# Patient Record
Sex: FEMALE | Race: BLACK OR AFRICAN AMERICAN | NOT HISPANIC OR LATINO | Employment: FULL TIME | URBAN - METROPOLITAN AREA
[De-identification: names, ages, dates, MRNs, and addresses within clinical notes are randomized per-mention and may not be internally consistent; named-entity substitution may affect disease eponyms.]

---

## 2017-05-15 RX ORDER — LABETALOL 100 MG/1
TABLET, FILM COATED ORAL
Qty: 60 TABLET | Refills: 0 | Status: SHIPPED | OUTPATIENT
Start: 2017-05-15 | End: 2019-08-02 | Stop reason: SDUPTHER

## 2018-03-05 ENCOUNTER — OFFICE VISIT (OUTPATIENT)
Dept: OBSTETRICS AND GYNECOLOGY | Facility: CLINIC | Age: 38
End: 2018-03-05
Payer: COMMERCIAL

## 2018-03-05 VITALS
DIASTOLIC BLOOD PRESSURE: 98 MMHG | SYSTOLIC BLOOD PRESSURE: 150 MMHG | BODY MASS INDEX: 39.8 KG/M2 | WEIGHT: 268.75 LBS | HEIGHT: 69 IN

## 2018-03-05 DIAGNOSIS — N97.9 INFERTILITY, FEMALE: ICD-10-CM

## 2018-03-05 DIAGNOSIS — N93.8 DUB (DYSFUNCTIONAL UTERINE BLEEDING): ICD-10-CM

## 2018-03-05 DIAGNOSIS — R10.2 PELVIC PAIN IN FEMALE: ICD-10-CM

## 2018-03-05 DIAGNOSIS — N93.0 PCB (POST COITAL BLEEDING): ICD-10-CM

## 2018-03-05 DIAGNOSIS — N89.8 VAGINAL DISCHARGE: ICD-10-CM

## 2018-03-05 DIAGNOSIS — Z01.419 ENCOUNTER FOR GYNECOLOGICAL EXAMINATION WITHOUT ABNORMAL FINDING: Primary | ICD-10-CM

## 2018-03-05 LAB
BACTERIA #/AREA URNS AUTO: ABNORMAL /HPF
BILIRUB UR QL STRIP: NEGATIVE
CANDIDA RRNA VAG QL PROBE: NEGATIVE
CLARITY UR REFRACT.AUTO: ABNORMAL
COLOR UR AUTO: YELLOW
G VAGINALIS RRNA GENITAL QL PROBE: NEGATIVE
GLUCOSE UR QL STRIP: NEGATIVE
HGB UR QL STRIP: ABNORMAL
HYALINE CASTS UR QL AUTO: 6 /LPF
KETONES UR QL STRIP: NEGATIVE
LEUKOCYTE ESTERASE UR QL STRIP: NEGATIVE
MICROSCOPIC COMMENT: ABNORMAL
NITRITE UR QL STRIP: NEGATIVE
PH UR STRIP: 5 [PH] (ref 5–8)
PROT UR QL STRIP: ABNORMAL
RBC #/AREA URNS AUTO: 7 /HPF (ref 0–4)
SP GR UR STRIP: 1.02 (ref 1–1.03)
SQUAMOUS #/AREA URNS AUTO: 8 /HPF
T VAGINALIS RRNA GENITAL QL PROBE: NEGATIVE
URN SPEC COLLECT METH UR: ABNORMAL
UROBILINOGEN UR STRIP-ACNC: NEGATIVE EU/DL
WBC #/AREA URNS AUTO: 2 /HPF (ref 0–5)

## 2018-03-05 PROCEDURE — 81001 URINALYSIS AUTO W/SCOPE: CPT

## 2018-03-05 PROCEDURE — 87491 CHLMYD TRACH DNA AMP PROBE: CPT

## 2018-03-05 PROCEDURE — 99385 PREV VISIT NEW AGE 18-39: CPT | Mod: S$GLB,,, | Performed by: OBSTETRICS & GYNECOLOGY

## 2018-03-05 PROCEDURE — 87086 URINE CULTURE/COLONY COUNT: CPT

## 2018-03-05 PROCEDURE — 99999 PR PBB SHADOW E&M-EST. PATIENT-LVL IV: CPT | Mod: PBBFAC,,, | Performed by: OBSTETRICS & GYNECOLOGY

## 2018-03-05 PROCEDURE — 87480 CANDIDA DNA DIR PROBE: CPT

## 2018-03-05 PROCEDURE — 88175 CYTOPATH C/V AUTO FLUID REDO: CPT

## 2018-03-05 RX ORDER — NAPROXEN SODIUM 550 MG/1
550 TABLET ORAL
Qty: 30 TABLET | Refills: 12 | Status: SHIPPED | OUTPATIENT
Start: 2018-03-05 | End: 2019-08-02

## 2018-03-05 RX ORDER — DIPHENHYDRAMINE HYDROCHLORIDE 25 MG/1
CAPSULE ORAL
Refills: 99 | COMMUNITY
Start: 2017-11-27 | End: 2018-03-05

## 2018-03-05 NOTE — PROGRESS NOTES
03/07/18 U/S  Uterus:  Size: 6.8 x 3 x 3.8 cm  Masses: None  Endometrium: Normal, measuring 7.4 mm.  Right ovary:  Size: 3 x 2 x 3.3 cm  Appearance: Normal with follicles.  Vascular flow: Normal.  Left ovary:  Size: 3 x 1.8 x 3.2 cm  Appearance: Normal with follicles.  Vascular Flow: Normal.  Free Fluid:  None.      Impression     No sonographic abnormality.   WNL         3/5/2018    Hemoglobin 12.8   Hematocrit 38.8   Trichomonas vaginalis Negative   Gardnerella vaginalis Negative   Candida sp Negative   Chlamydia, Amplified DNA Not Detected   Specimen UA Urine, Clean Catch   Color, UA Yellow   pH, UA 5.0   Specific [place], UA 1.020   Appearance, UA Hazy (A)   Protein, UA 1+ (A)   Glucose, UA Negative   Ketones, UA Negative   Occult Blood UA 2+ (A)   Nitrite, UA Negative   Urobilinogen, UA Negative   Bilirubin (UA) Negative   Leukocytes, UA Negative   RBC, UA 7 (H)   WBC, UA 2   Bacteria, UA None   Squam Epithel, UA 8   Hyaline Casts, UA 6 (A)   Urine Culture, Routine No significant gr...   N gonorrhoeae, amplified DNA Not Detected   NEEDS PCP FOR ABN UA      Patient ID: Panchito Younger is a 37 y.o. female.    Chief Complaint:  Vaginal Discharge; Vaginal Pain (during intercourse); Pelvic Pain; and Medication Refill      History of Present Illness  HPI   36 y/o female, new patient, reports cycle lasting ~20 days for the past 4-6 months, 4-5 days prior to that. Changing pads hourly. Associated heavier bleeding and increase in clots, though she has always had heavy cycles and cramping since menarche at 10 y/o. She has never been pregnant, but has been trying for 6 years, has never sought infertility workup. She also reports odorous vaginal discharge and bleeding after intercourse that began around the same time (4-6 mos ago).   SP AND LLQ PAINS THAT RADIATES    ROS:  GENERAL: No fever, chills, fatigability or weight loss.  VULVAR: No pain, no lesions and no itching.  VAGINAL: + Abnormal bleeding. No relaxation, no  itching, no discharge, no lesions.  ABDOMEN: Intermittent abdominal/ pelvic pain. Denies nausea. Denies vomiting. No diarrhea. No constipation.  BREAST: Denies pain. No lumps. No discharge.  URINARY: No incontinence, no nocturia, no frequency and no dysuria.  CARDIOVASCULAR: No chest pain. No shortness of breath. No leg cramps.  NEUROLOGICAL: No headaches. No vision changes.  The remainder of the review of systems was negative.    PE:  General Appearance: obese And Well developed. Well nourished. In no acute distress.  Vulva: Lesions: No.  Urethral Meatus: Normal size. Normal location. No lesions. No prolapse.  Urethra: No masses. No tenderness. No prolapse. No scarring.  Bladder: No masses. No tenderness.  Vagina: Mucosa NI:yes discharge yes, atrophy no, cystocele no or rectocele no. BROWN MENSES LIGHT  Cervix: Lesion: no  Stenotic: no Cervical motion tenderness: no  Uterus: Uterus size: 7 weeks. Support good. Uterus size: Normal  Adnexa: Masses: No Tenderness: No CDS Nodularity: No  Abdomen: obese No masses. No tenderness.  Breasts: No bilateral masses. No bilateral discharge. No bilateral tenderness. No bilateral fibrocystic changes.  Neck: No thyroid enlargement. No thyroid masses.  Skin: Rashes: No      PROCEDURES:  UPT -    PLAN:     DIAGNOSIS:  1. Encounter for gynecological examination without abnormal finding    2. Pelvic pain in female    3. DUB (dysfunctional uterine bleeding)    4. Vaginal discharge    5. PCB (post coital bleeding)    6. Infertility, female        MEDICATIONS & ORDERS:  Orders Placed This Encounter    C. trachomatis/N. gonorrhoeae by AMP DNA Cervix    Vaginosis Screen by DNA Probe    Urine culture    US Pelvis Comp with Transvag NON-OB (xpd    CBC auto differential    TSH    Urinalysis    Ambulatory referral to Infertility    Liquid-based pap smear, screening    naproxen sodium (ANAPROX) 550 MG tablet       Patient was counseled today on the new ACS guidelines for cervical  cytology screening as well as the current recommendations for breast cancer screening. She was counseled to follow up with her PCP for other routine health maintenance. Counseling session lasted approximately 10 minutes, and all her questions were answered.     10 MIN D/W PT ON PAIN AS IT MAY NOT BE GYN AND BLEEDING    FOLLOW-UP: With me in AFTER U/S

## 2018-03-05 NOTE — PROGRESS NOTES
Subjective:       Patient ID: Panchito Younger is a 37 y.o. female.    Chief Complaint:  Vaginal Discharge; Vaginal Pain (during intercourse); Pelvic Pain; and Medication Refill      History of Present Illness  HPI   38 y/o female, new patient, reports cycle lasting ~20 days for the past 4-6 months, 4-5 days prior to that. Changing pads hourly. Associated heavier bleeding and increase in clots, though she has always had heavy cycles and cramping since menarche at 12 y/o. She has never been pregnant, but has been trying for 6 years, has never sought infertility workup. She also reports odorous vaginal discharge and bleeding after intercourse that began around the same time (4-6 mos ago).   {OBG HPI BLOCKS:75776}    GYN & OB History  Patient's last menstrual period was 02/15/2018 (exact date).   Date of Last Pap: 2015    OB History    Para Term  AB Living   0 0 0 0 0 0   SAB TAB Ectopic Multiple Live Births   0 0 0 0               Review of Systems  Review of Systems        Objective:    Physical Exam       Assessment:      No diagnosis found.   ***         Plan:      ***

## 2018-03-06 LAB
C TRACH DNA SPEC QL NAA+PROBE: NOT DETECTED
N GONORRHOEA DNA SPEC QL NAA+PROBE: NOT DETECTED

## 2018-03-07 LAB — BACTERIA UR CULT: NORMAL

## 2018-04-24 ENCOUNTER — TELEPHONE (OUTPATIENT)
Dept: OBSTETRICS AND GYNECOLOGY | Facility: CLINIC | Age: 38
End: 2018-04-24

## 2018-04-24 NOTE — TELEPHONE ENCOUNTER
----- Message from Amanda Henry sent at 4/24/2018  2:43 PM CDT -----  Contact: Panchito  Name of Who is Calling:Panchito      What is the request in detail: Patient is requesting a Diflucan pill be called in to her local pharmacy [facility]      Can the clinic reply by [facility]:Yes       What Number to Call Back if not in [facility]:[phone]

## 2019-08-02 ENCOUNTER — OFFICE VISIT (OUTPATIENT)
Dept: OBSTETRICS AND GYNECOLOGY | Facility: CLINIC | Age: 39
End: 2019-08-02
Payer: COMMERCIAL

## 2019-08-02 ENCOUNTER — TELEPHONE (OUTPATIENT)
Dept: OBSTETRICS AND GYNECOLOGY | Facility: CLINIC | Age: 39
End: 2019-08-02

## 2019-08-02 VITALS
SYSTOLIC BLOOD PRESSURE: 156 MMHG | DIASTOLIC BLOOD PRESSURE: 116 MMHG | WEIGHT: 269.38 LBS | HEIGHT: 69 IN | BODY MASS INDEX: 39.9 KG/M2

## 2019-08-02 DIAGNOSIS — Z12.39 ENCOUNTER FOR SCREENING FOR MALIGNANT NEOPLASM OF BREAST: ICD-10-CM

## 2019-08-02 DIAGNOSIS — I10 HYPERTENSION, UNSPECIFIED TYPE: ICD-10-CM

## 2019-08-02 DIAGNOSIS — N92.6 IRREGULAR PERIODS/MENSTRUAL CYCLES: ICD-10-CM

## 2019-08-02 DIAGNOSIS — N89.8 VAGINAL IRRITATION: ICD-10-CM

## 2019-08-02 DIAGNOSIS — R10.2 PELVIC PAIN: Primary | ICD-10-CM

## 2019-08-02 PROCEDURE — 3008F BODY MASS INDEX DOCD: CPT | Mod: CPTII,S$GLB,, | Performed by: OBSTETRICS & GYNECOLOGY

## 2019-08-02 PROCEDURE — 3080F DIAST BP >= 90 MM HG: CPT | Mod: CPTII,S$GLB,, | Performed by: OBSTETRICS & GYNECOLOGY

## 2019-08-02 PROCEDURE — 3008F PR BODY MASS INDEX (BMI) DOCUMENTED: ICD-10-PCS | Mod: CPTII,S$GLB,, | Performed by: OBSTETRICS & GYNECOLOGY

## 2019-08-02 PROCEDURE — 99396 PR PREVENTIVE VISIT,EST,40-64: ICD-10-PCS | Mod: S$GLB,,, | Performed by: OBSTETRICS & GYNECOLOGY

## 2019-08-02 PROCEDURE — 99396 PREV VISIT EST AGE 40-64: CPT | Mod: S$GLB,,, | Performed by: OBSTETRICS & GYNECOLOGY

## 2019-08-02 PROCEDURE — 3077F PR MOST RECENT SYSTOLIC BLOOD PRESSURE >= 140 MM HG: ICD-10-PCS | Mod: CPTII,S$GLB,, | Performed by: OBSTETRICS & GYNECOLOGY

## 2019-08-02 PROCEDURE — 99999 PR PBB SHADOW E&M-EST. PATIENT-LVL IV: ICD-10-PCS | Mod: PBBFAC,,, | Performed by: OBSTETRICS & GYNECOLOGY

## 2019-08-02 PROCEDURE — 87481 CANDIDA DNA AMP PROBE: CPT | Mod: 59

## 2019-08-02 PROCEDURE — 3077F SYST BP >= 140 MM HG: CPT | Mod: CPTII,S$GLB,, | Performed by: OBSTETRICS & GYNECOLOGY

## 2019-08-02 PROCEDURE — 99999 PR PBB SHADOW E&M-EST. PATIENT-LVL IV: CPT | Mod: PBBFAC,,, | Performed by: OBSTETRICS & GYNECOLOGY

## 2019-08-02 PROCEDURE — 3080F PR MOST RECENT DIASTOLIC BLOOD PRESSURE >= 90 MM HG: ICD-10-PCS | Mod: CPTII,S$GLB,, | Performed by: OBSTETRICS & GYNECOLOGY

## 2019-08-02 RX ORDER — LABETALOL 100 MG/1
200 TABLET, FILM COATED ORAL EVERY 12 HOURS
Qty: 60 TABLET | Refills: 1 | Status: SHIPPED | OUTPATIENT
Start: 2019-08-02 | End: 2019-08-02 | Stop reason: SDUPTHER

## 2019-08-02 RX ORDER — LABETALOL 100 MG/1
200 TABLET, FILM COATED ORAL EVERY 12 HOURS
Qty: 180 TABLET | Refills: 1 | Status: SHIPPED | OUTPATIENT
Start: 2019-08-02 | End: 2020-01-15 | Stop reason: SDUPTHER

## 2019-08-02 NOTE — TELEPHONE ENCOUNTER
----- Message from Zuleika Drake sent at 8/2/2019 10:02 AM CDT -----  Contact: MARJ ZARAGOZA [[id number]]  Name of Who is Calling:MARJ ZARAGOZA [[id number]]      What is the request in detail: Pt requesting 90 day refill for labetalol (NORMODYNE) 100 MG tablet. Insurance company only cowers for 90 days.send to [facility]/pharmacy #[id number] - [place], [place] - [address]   [phone] (Phone)  [phone] (Fax)    Thanks-    Can the clinic reply by [facility]: N    What [facility]: [phone]

## 2019-08-02 NOTE — PATIENT INSTRUCTIONS
Female Infertility:  1. Are you ovulating?  - the first day of your cycle is Day #1  - Buy ovulation predictor kits: Day 10-Day 16 (Ovulate on average Day 14). If you get a positive, have intercourse that day and the next  - Come to the lab on Day#21 and have bloodwork (progesterone level)

## 2019-08-02 NOTE — PROGRESS NOTES
History & Physical  Gynecology      SUBJECTIVE:     Chief Complaint: Gynecologic Exam       History of Present Illness:  Ms. Younger is a 39 yr old female who presents for annual, well woman exam. Complaints: Prolonged cycles. Desires fertility. Has not done any OPKs for workup. Also complains of LLQ pain. Denies any GI or UTI symptoms. Hx of hypertension. Elevated in clinic. Needs PCP. Also with vaginal irritation after wearing new underwear. No hx abnormal paps. Last pap was . Currently sexually active. No hx of STIs. Denies fam hx of breast, ovarian or colon cancer. Feels safe at home, wears seatbelts, exercises intermittently.        Review of patient's allergies indicates:   Allergen Reactions    Eggplant Hives, Itching, Rash, Swelling and Anaphylaxis       Past Medical History:   Diagnosis Date    Hypertension     Smoker      History reviewed. No pertinent surgical history.  OB History        0    Para   0    Term   0       0    AB   0    Living   0       SAB   0    TAB   0    Ectopic   0    Multiple   0    Live Births                   Family History   Problem Relation Age of Onset    Breast cancer Neg Hx     Colon cancer Neg Hx     Ovarian cancer Neg Hx      Social History     Tobacco Use    Smoking status: Current Every Day Smoker     Packs/day: 0.25    Smokeless tobacco: Current User   Substance Use Topics    Alcohol use: Yes     Comment: occ    Drug use: Yes     Types: Marijuana       Current Outpatient Medications   Medication Sig    labetalol (NORMODYNE) 100 MG tablet Take 2 tablets (200 mg total) by mouth every 12 (twelve) hours.     No current facility-administered medications for this visit.          Review of Systems:  Review of Systems   Constitutional: Negative for activity change, fatigue, fever and unexpected weight change.   Respiratory: Negative for cough and shortness of breath.    Cardiovascular: Negative for chest pain and palpitations.   Gastrointestinal:  Negative for abdominal pain, constipation, diarrhea and nausea.   Endocrine: Negative for hot flashes.   Genitourinary: Positive for menstrual problem (Prolonged cycles), pelvic pain (LLQ) and vaginal pain. Negative for dyspareunia, dysuria, menorrhagia and vaginal discharge.   Musculoskeletal: Negative for back pain.   Integumentary:  Negative for nipple discharge.   Neurological: Negative for headaches.   Psychiatric/Behavioral: The patient is not nervous/anxious.    Breast: Negative for nipple discharge       OBJECTIVE:     Physical Exam:  Physical Exam   Constitutional: She is oriented to person, place, and time. She appears well-developed and well-nourished.   HENT:   Head: Normocephalic and atraumatic.   Neck: Normal range of motion. Neck supple.   Cardiovascular: Normal rate, regular rhythm, normal heart sounds and intact distal pulses.   Pulmonary/Chest: Effort normal and breath sounds normal. Right breast exhibits no inverted nipple, no mass, no nipple discharge, no skin change and no tenderness. Left breast exhibits no inverted nipple, no mass, no nipple discharge, no skin change and no tenderness.   Abdominal: Soft. Bowel sounds are normal. There is no tenderness. There is no guarding.   Genitourinary: There is no rash, tenderness, lesion or injury on the right labia. There is no rash, tenderness, lesion or injury on the left labia. Uterus is not deviated, not enlarged, not fixed and not tender. Cervix exhibits no motion tenderness, no discharge and no friability. Right adnexum displays no mass, no tenderness and no fullness. Left adnexum displays no mass, no tenderness and no fullness. No erythema, tenderness or bleeding in the vagina. No foreign body in the vagina. No signs of injury around the vagina. No vaginal discharge found.   Genitourinary Comments: Large amount of stool on exam   Neurological: She is alert and oriented to person, place, and time.   Skin: Skin is warm and dry.   Psychiatric: She  has a normal mood and affect.   Vitals reviewed.        ASSESSMENT:       ICD-10-CM ICD-9-CM    1. Pelvic pain R10.2 HUA0764 US Pelvis Comp with Transvag NON-OB (xpd   2. Encounter for screening for malignant neoplasm of breast Z12.31 V76.10 Mammo Digital Screening Bilat   3. Vaginal irritation N89.8 623.9 Vaginosis Screen by DNA Probe   4. Hypertension, unspecified type I10 401.9 Ambulatory referral to Family Practice   5. Irregular periods/menstrual cycles N92.6 626.4 Progesterone          Plan:      Annual well woman done today. Pap in 2 years. Last pap 2018 was normal  TVUS for pelvic pain. Large amount of stool. Discussed daily miralax for control of constipation  Affirm done for vaginal irritation. Advised to discontinue new underwear  Discussed OPKs and timed intercourse for desired fertility. D21 progesterone ordered  Amb ref to fam practice. Elevated BPs today. Refill for labetalol sent to pharmacy. Asymptomatic  RTC in 1 yr for annual exam or PRN    Counseling time: 15 minutes    Duane Saldivar

## 2019-08-02 NOTE — TELEPHONE ENCOUNTER
Spoke with pt. Advised pt that Dr Saldivar did send in a refill for her medication. Pt verbalized understanding.

## 2019-08-02 NOTE — TELEPHONE ENCOUNTER
----- Message from Zuleika Drake sent at 8/2/2019  1:03 PM CDT -----  Contact: MARJ ZARAGOZA [[id number]]  Name of Who is Calling:MARJ ZARAGOZA [[id number]]      What is the request in detail: Pt has additional regarding 90 day supply for labetalol (NORMODYNE) 100 MG tablet. Contact pt to further discuss. Thanks-    Can the clinic reply by [facility]: N    What [facility]: [phone]

## 2019-08-02 NOTE — TELEPHONE ENCOUNTER
Spoke with pt. Pt needs the RX to be written for 90 DAYS for insurance to cover it. Can we please send in a new prescription for pt. Please advise.

## 2019-08-03 LAB
BACTERIAL VAGINOSIS DNA: NEGATIVE
CANDIDA GLABRATA DNA: NEGATIVE
CANDIDA KRUSEI DNA: NEGATIVE
CANDIDA RRNA VAG QL PROBE: NEGATIVE
T VAGINALIS RRNA GENITAL QL PROBE: NEGATIVE

## 2021-04-16 ENCOUNTER — PATIENT MESSAGE (OUTPATIENT)
Dept: RESEARCH | Facility: HOSPITAL | Age: 41
End: 2021-04-16

## 2021-08-06 ENCOUNTER — PATIENT MESSAGE (OUTPATIENT)
Dept: ADMINISTRATIVE | Facility: OTHER | Age: 41
End: 2021-08-06

## 2021-11-29 ENCOUNTER — OFFICE VISIT (OUTPATIENT)
Dept: PODIATRY | Facility: CLINIC | Age: 41
End: 2021-11-29
Payer: COMMERCIAL

## 2021-11-29 VITALS
SYSTOLIC BLOOD PRESSURE: 185 MMHG | HEIGHT: 69 IN | WEIGHT: 293 LBS | DIASTOLIC BLOOD PRESSURE: 116 MMHG | BODY MASS INDEX: 43.4 KG/M2 | HEART RATE: 80 BPM

## 2021-11-29 DIAGNOSIS — L60.0 INGROWN NAIL: Primary | ICD-10-CM

## 2021-11-29 DIAGNOSIS — B35.1 ONYCHOMYCOSIS: ICD-10-CM

## 2021-11-29 DIAGNOSIS — S90.212A CONTUSION OF LEFT GREAT TOE WITH DAMAGE TO NAIL, INITIAL ENCOUNTER: ICD-10-CM

## 2021-11-29 DIAGNOSIS — M79.675 PAIN OF LEFT GREAT TOE: ICD-10-CM

## 2021-11-29 PROCEDURE — 99203 OFFICE O/P NEW LOW 30 MIN: CPT | Mod: S$GLB,,, | Performed by: PODIATRIST

## 2021-11-29 PROCEDURE — 99999 PR PBB SHADOW E&M-EST. PATIENT-LVL III: ICD-10-PCS | Mod: PBBFAC,,, | Performed by: PODIATRIST

## 2021-11-29 PROCEDURE — 99203 PR OFFICE/OUTPT VISIT, NEW, LEVL III, 30-44 MIN: ICD-10-PCS | Mod: S$GLB,,, | Performed by: PODIATRIST

## 2021-11-29 PROCEDURE — 99999 PR PBB SHADOW E&M-EST. PATIENT-LVL III: CPT | Mod: PBBFAC,,, | Performed by: PODIATRIST

## 2024-03-16 ENCOUNTER — PATIENT MESSAGE (OUTPATIENT)
Dept: ADMINISTRATIVE | Facility: OTHER | Age: 44
End: 2024-03-16